# Patient Record
Sex: FEMALE | Race: WHITE | NOT HISPANIC OR LATINO | URBAN - METROPOLITAN AREA
[De-identification: names, ages, dates, MRNs, and addresses within clinical notes are randomized per-mention and may not be internally consistent; named-entity substitution may affect disease eponyms.]

---

## 2020-08-08 ENCOUNTER — EMERGENCY (EMERGENCY)
Facility: HOSPITAL | Age: 30
LOS: 1 days | Discharge: HOME | End: 2020-08-08
Attending: EMERGENCY MEDICINE | Admitting: EMERGENCY MEDICINE
Payer: MEDICAID

## 2020-08-08 VITALS
OXYGEN SATURATION: 100 % | HEART RATE: 82 BPM | RESPIRATION RATE: 16 BRPM | TEMPERATURE: 98 F | DIASTOLIC BLOOD PRESSURE: 59 MMHG | SYSTOLIC BLOOD PRESSURE: 107 MMHG

## 2020-08-08 VITALS
HEART RATE: 88 BPM | TEMPERATURE: 98 F | SYSTOLIC BLOOD PRESSURE: 146 MMHG | OXYGEN SATURATION: 100 % | DIASTOLIC BLOOD PRESSURE: 109 MMHG | RESPIRATION RATE: 16 BRPM

## 2020-08-08 PROCEDURE — 99283 EMERGENCY DEPT VISIT LOW MDM: CPT

## 2020-08-08 RX ORDER — BUPRENORPHINE AND NALOXONE 2; .5 MG/1; MG/1
1 TABLET SUBLINGUAL ONCE
Refills: 0 | Status: DISCONTINUED | OUTPATIENT
Start: 2020-08-08 | End: 2020-08-08

## 2020-08-08 RX ADMIN — BUPRENORPHINE AND NALOXONE 1 TABLET(S): 2; .5 TABLET SUBLINGUAL at 12:58

## 2020-08-08 NOTE — ED ADULT NURSE NOTE - NSIMPLEMENTINTERV_GEN_ALL_ED
Implemented All Fall Risk Interventions:  [place] to call system. Call bell, personal items and telephone within reach. Instruct patient to call for assistance. Room bathroom lighting operational. Non-slip footwear when patient is off stretcher. Physically safe environment: no spills, clutter or unnecessary equipment. Stretcher in lowest position, wheels locked, appropriate side rails in place. Provide visual cue, wrist band, yellow gown, etc. Monitor gait and stability. Monitor for mental status changes and reorient to person, place, and time. Review medications for side effects contributing to fall risk. Reinforce activity limits and safety measures with patient and family.

## 2020-08-08 NOTE — ED ADULT TRIAGE NOTE - CHIEF COMPLAINT QUOTE
brought in by EMS from [facility] for c/o withdrawal symptoms from heroine. Pt used heroine last night. C/o sweats, tremors, headaches, nausea and generalized body pain. Pt states Suboxone prescription ran out 3 wks ago. Pt came from [place] less than 2 wks ago.

## 2020-08-08 NOTE — ED PROVIDER NOTE - NSFOLLOWUPINSTRUCTIONS_ED_ALL_ED_FT
Please stop using any drugs.     Please call the SBIRT number the  gave you.     Return to the Emergency Department for worsening or persistent symptoms, and/or ANY NEW OR CONCERNING SYMPTOMS. If you have issues obtaining follow up, please call: [phone] to obtain a doctor or specialist who takes your insurance in your area.

## 2020-08-08 NOTE — ED ADULT NURSE NOTE - OBJECTIVE STATEMENT
Pt to bed 7. Alert and oriented x 3. Pt c./o Heroin withdrawal. Pt states that his last heroin use was last night. Unable to get her Suboxone. States that she feels jittery and not well. Awaiting MD evaluation.

## 2020-08-08 NOTE — ED PROVIDER NOTE - PHYSICAL EXAMINATION
Gen: Alert and oriented. Lying comfortably in bed. Answering questions appropriately  HEENT: pupils mildly dilated bl, extra occular movements intact, no nasal discharge, mucous membranes moist  CV: Regular rate and rhythm, +S1/S2, no murmurs/rubs/gallops,   Resp: Clear to ausculation bilaterally, no wheezes/rhonchi/rales  GI: Abdomen soft non-distended, non tender to palpation, no masses  MSK: piloerectionon to skin, No open wounds, no bruising, no LE edema, Homans sign negative bl  Neuro: A&Ox4, following commands, moving all four extremities spontaneously  Psych: appropriate mood

## 2020-08-08 NOTE — ED PROVIDER NOTE - OBJECTIVE STATEMENT
31 yo F with pmh of seizure disorder and heroin abuse presenting for heroin withdrawal. States she resides in [place] and is here visiting but is going back to  [place] this week. However, patient is currently homeless and residing in shelter. States she takes suboxone for opiod addiction and has been having a hard time getting them here because she does not have insurance. Used heroin last night because she could not get any suboxone. States she feels anxious, nauseas. Otherwise denies any other complaints. Denies any other drug use including alcohol and benzos.

## 2020-08-08 NOTE — ED PROVIDER NOTE - CLINICAL SUMMARY MEDICAL DECISION MAKING FREE TEXT BOX
Joseph Frankel PGY2: 29 yo F with heroin withdrawal. VSS. Patient mildly anxious but looks well and is non toxic appearing. PE as above. Most likely opiod withdrawal. Will give patient her dose of Suboxone here.  spoke with patient and gave her number to SBIRT as well as other resources for drug addiction. No concern for concomitant withdrawal from other substances at this time. Educated patient on the importance of stopping to use any drugs. Will DC. Discussed plan and return precautions with patient who understands and agrees. All questions answered.

## 2020-08-08 NOTE — ED PROVIDER NOTE - NS ED ROS FT
Gen: Denies fever, weight loss  CV: Denies chest pain, palpitations  HEENT: Denies neck pain, sore throat, eye discharge  Skin: Denies rash, erythema, color changes  Resp: Denies SOB, cough  Endo: Denies sensitivity to heat, cold, increased urination  GI: Denies constipation, vomiting, diarrhea  Msk: Denies back pain, LE swelling, extremity pain  : Denies dysuria, increased frequency  Neuro: Denies LOC, weakness, seizures  Psych: Denies hx of psych, hallucinations, SI

## 2020-08-08 NOTE — PROVIDER CONTACT NOTE (OTHER) - BACKGROUND
SW contacted by Pt’s MD Frankel, J. who states Pt is from [place] staying in a shelter and it going back to [place] in a few days, Pt is asking about getting Suboxone. SW met with Pt who is A&Ox3, she informs

## 2020-08-08 NOTE — ED ADULT NURSE NOTE - CHIEF COMPLAINT QUOTE
brought in by EMS from [facility] for c/o withdrawal symptoms from heroine. Pt used heroine last night. C/o sweats, tremors, headaches, nausea and generalized body pain. Pt states Suboxone prescription ran out 3 wks ago. Pt came from [place] less than 2 wks ago.

## 2020-08-08 NOTE — PROVIDER CONTACT NOTE (OTHER) - ASSESSMENT
she is staying at the [address], [place] [address], is from [place] and has medical insurance in [place] not [place].  She states she was getting Suboxone in [place], not [place], Pt confirms she is returning to [place] in a few days.  Pt informed MD she’s been using street drugs.  SW provided Telephonic SBIRT, Substance Abuse Resources for Detox/Rehab, and [facility] Shelter [facility].  Pt refused any further substance use assistance.  Education was provided on ill effects of substance use, Pt verbalized understanding.  SW contacted Telephonic SBIRT, unable to provide more assistance as Pt has no insurance and has used drugs within 24hrs.  Pt is adamant about returning to [place] and does not want any further SW assistance in this matter.  RADHA discussed all resources provided to Pt with MD.  Pt is cleared for D/C.  RADHA additionally provided Pt with metrocard for travel back to shelter and printed bus travel instructions.  No further intervention required at this time.

## 2020-08-08 NOTE — ED PROVIDER NOTE - PATIENT PORTAL LINK FT
You can access the FollowMyHealth Patient Portal offered by [facility] by registering at the following website: [email or web address]. By joining [employer]’s FollowMyHealth portal, you will also be able to view your health information using other applications (apps) compatible with our system. Patient baseline mental status

## 2020-08-08 NOTE — ED PROVIDER NOTE - ATTENDING CONTRIBUTION TO CARE
Afebrile. Awake and Alert. Lungs CTA. Heart RRR. Abdomen soft NTND. CN II-XII grossly intact. Moves all extremities without lateralization. Calm, cooperative.    h/o heroine abuse on Suboxone in [place] requesting Suboxone before returning to [place] this week. No hits in iSTOP database available states. Pt report she has a Narcan kit. SW provided resources for [facility]. Patient monitored for a few hours post Suboxone administration. Afebrile. Awake and Alert. Lungs CTA. Heart RRR. Abdomen soft NTND. CN II-XII grossly intact. Moves all extremities without lateralization. Calm, cooperative.    h/o heroine abuse on Suboxone in [place] requesting Suboxone before returning to [place] this week. No hits in iSTOP database available states. Pt reports she has access to a Narcan kit. SW provided resources for [facility]. Patient monitored for a few hours post Suboxone administration.